# Patient Record
Sex: FEMALE | Race: WHITE
[De-identification: names, ages, dates, MRNs, and addresses within clinical notes are randomized per-mention and may not be internally consistent; named-entity substitution may affect disease eponyms.]

---

## 2017-04-06 ENCOUNTER — HOSPITAL ENCOUNTER (EMERGENCY)
Dept: HOSPITAL 62 - ER | Age: 52
Discharge: HOME | End: 2017-04-06
Payer: MEDICAID

## 2017-04-06 VITALS — SYSTOLIC BLOOD PRESSURE: 129 MMHG | DIASTOLIC BLOOD PRESSURE: 79 MMHG

## 2017-04-06 DIAGNOSIS — R11.10: ICD-10-CM

## 2017-04-06 DIAGNOSIS — Z86.69: ICD-10-CM

## 2017-04-06 DIAGNOSIS — Z87.828: ICD-10-CM

## 2017-04-06 DIAGNOSIS — H54.42: ICD-10-CM

## 2017-04-06 DIAGNOSIS — Z88.0: ICD-10-CM

## 2017-04-06 DIAGNOSIS — R51: Primary | ICD-10-CM

## 2017-04-06 DIAGNOSIS — M25.559: ICD-10-CM

## 2017-04-06 DIAGNOSIS — Z98.890: ICD-10-CM

## 2017-04-06 DIAGNOSIS — Z88.5: ICD-10-CM

## 2017-04-06 DIAGNOSIS — Z79.891: ICD-10-CM

## 2017-04-06 DIAGNOSIS — R03.0: ICD-10-CM

## 2017-04-06 DIAGNOSIS — G89.29: ICD-10-CM

## 2017-04-06 LAB
APPEARANCE UR: (no result)
BILIRUB UR QL STRIP: NEGATIVE
GLUCOSE UR STRIP-MCNC: NEGATIVE MG/DL
KETONES UR STRIP-MCNC: NEGATIVE MG/DL
NITRITE UR QL STRIP: NEGATIVE
PH UR STRIP: 5 [PH] (ref 5–9)
PROT UR STRIP-MCNC: NEGATIVE MG/DL
SP GR UR STRIP: 1.03
UROBILINOGEN UR-MCNC: NEGATIVE MG/DL (ref ?–2)

## 2017-04-06 PROCEDURE — 99284 EMERGENCY DEPT VISIT MOD MDM: CPT

## 2017-04-06 PROCEDURE — 96372 THER/PROPH/DIAG INJ SC/IM: CPT

## 2017-04-06 PROCEDURE — 70450 CT HEAD/BRAIN W/O DYE: CPT

## 2017-04-06 PROCEDURE — 96375 TX/PRO/DX INJ NEW DRUG ADDON: CPT

## 2017-04-06 PROCEDURE — 96374 THER/PROPH/DIAG INJ IV PUSH: CPT

## 2017-04-06 PROCEDURE — 81001 URINALYSIS AUTO W/SCOPE: CPT
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
## 2018-09-27 ENCOUNTER — HOSPITAL ENCOUNTER (EMERGENCY)
Dept: HOSPITAL 62 - ER | Age: 53
Discharge: HOME | End: 2018-09-27
Payer: COMMERCIAL

## 2018-09-27 VITALS — DIASTOLIC BLOOD PRESSURE: 88 MMHG | SYSTOLIC BLOOD PRESSURE: 150 MMHG

## 2018-09-27 DIAGNOSIS — F17.200: ICD-10-CM

## 2018-09-27 DIAGNOSIS — M54.5: Primary | ICD-10-CM
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
## 2018-09-27 DIAGNOSIS — M50.30: ICD-10-CM

## 2018-09-27 DIAGNOSIS — M41.9: ICD-10-CM

## 2018-09-27 DIAGNOSIS — M47.9: ICD-10-CM

## 2018-09-27 DIAGNOSIS — M51.36: ICD-10-CM

## 2018-09-27 PROCEDURE — 72110 X-RAY EXAM L-2 SPINE 4/>VWS: CPT

## 2018-09-27 PROCEDURE — 72050 X-RAY EXAM NECK SPINE 4/5VWS: CPT

## 2018-09-27 PROCEDURE — 99283 EMERGENCY DEPT VISIT LOW MDM: CPT
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
## 2018-12-08 ENCOUNTER — HOSPITAL ENCOUNTER (EMERGENCY)
Dept: HOSPITAL 62 - ER | Age: 53
Discharge: HOME | End: 2018-12-08
Payer: COMMERCIAL

## 2018-12-08 VITALS — SYSTOLIC BLOOD PRESSURE: 136 MMHG | DIASTOLIC BLOOD PRESSURE: 88 MMHG

## 2018-12-08 DIAGNOSIS — Z88.0: ICD-10-CM

## 2018-12-08 DIAGNOSIS — M25.562: ICD-10-CM

## 2018-12-08 DIAGNOSIS — Z88.5: ICD-10-CM
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
## 2018-12-08 DIAGNOSIS — S89.92XA: Primary | ICD-10-CM

## 2018-12-08 DIAGNOSIS — R60.0: ICD-10-CM
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
## 2018-12-08 DIAGNOSIS — Z72.0: ICD-10-CM

## 2018-12-08 PROCEDURE — 99283 EMERGENCY DEPT VISIT LOW MDM: CPT

## 2018-12-08 NOTE — RADIOLOGY REPORT (SQ)
EXAM DESCRIPTION:  KNEE LEFT 4 VIEW



COMPLETED DATE/TIME:  12/8/2018 5:26 pm



REASON FOR STUDY:  Fell and injured left knee in uncertain manner



COMPARISON:  4/18/2011



NUMBER OF VIEWS:  Four views.



TECHNIQUE:  AP, lateral, and both oblique radiographic images acquired of the left knee.



LIMITATIONS:  None.



FINDINGS:  MINERALIZATION: Normal.

BONES: No acute fracture or dislocation.  No worrisome bone lesions.

JOINT: No effusion.

SOFT TISSUES: No soft tissue swelling.  No radio-opaque foreign body.

OTHER: No other significant finding.



IMPRESSION:  NEGATIVE STUDY OF THE LEFT KNEE. NO RADIOGRAPHIC EVIDENCE OF ACUTE INJURY.



TECHNICAL DOCUMENTATION:  JOB ID:  [id number]

 2011 [employer]- All Rights Reserved



Reading location - IP/workstation name: [email or web address]
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
## 2021-01-17 ENCOUNTER — HOSPITAL ENCOUNTER (EMERGENCY)
Dept: HOSPITAL 62 - ER | Age: 56
LOS: 1 days | Discharge: HOME | End: 2021-01-18
Payer: MEDICAID

## 2021-01-17 DIAGNOSIS — F17.200: ICD-10-CM

## 2021-01-17 DIAGNOSIS — Z88.6: ICD-10-CM

## 2021-01-17 DIAGNOSIS — Z88.5: ICD-10-CM

## 2021-01-17 DIAGNOSIS — Z79.1: ICD-10-CM

## 2021-01-17 DIAGNOSIS — Z88.0: ICD-10-CM

## 2021-01-17 DIAGNOSIS — H53.149: ICD-10-CM

## 2021-01-17 DIAGNOSIS — Z79.891: ICD-10-CM

## 2021-01-17 DIAGNOSIS — R11.0: ICD-10-CM

## 2021-01-17 DIAGNOSIS — R03.0: ICD-10-CM

## 2021-01-17 DIAGNOSIS — G43.909: Primary | ICD-10-CM

## 2021-01-17 LAB
ADD MANUAL DIFF: NO
ALBUMIN SERPL-MCNC: 3.6 G/DL (ref 3.5–5)
ALP SERPL-CCNC: 112 U/L (ref 38–126)
ANION GAP SERPL CALC-SCNC: 7 MMOL/L (ref 5–19)
AST SERPL-CCNC: 15 U/L (ref 14–36)
BASOPHILS # BLD AUTO: 0.1 10^3/UL (ref 0–0.2)
BASOPHILS NFR BLD AUTO: 0.6 % (ref 0–2)
BILIRUB DIRECT SERPL-MCNC: 0.3 MG/DL (ref 0–0.4)
BILIRUB SERPL-MCNC: 0.3 MG/DL (ref 0.2–1.3)
BUN SERPL-MCNC: 16 MG/DL (ref 7–20)
CALCIUM: 9.1 MG/DL (ref 8.4–10.2)
CHLORIDE SERPL-SCNC: 104 MMOL/L (ref 98–107)
CO2 SERPL-SCNC: 27 MMOL/L (ref 22–30)
EOSINOPHIL # BLD AUTO: 0.2 10^3/UL (ref 0–0.6)
EOSINOPHIL NFR BLD AUTO: 1.7 % (ref 0–6)
ERYTHROCYTE [DISTWIDTH] IN BLOOD BY AUTOMATED COUNT: 20.4 % (ref 11.5–14)
GLUCOSE SERPL-MCNC: 139 MG/DL (ref 75–110)
HCT VFR BLD CALC: 35.1 % (ref 36–47)
HGB BLD-MCNC: 11.2 G/DL (ref 12–15.5)
LYMPHOCYTES # BLD AUTO: 1.2 10^3/UL (ref 0.5–4.7)
LYMPHOCYTES NFR BLD AUTO: 11.8 % (ref 13–45)
MCH RBC QN AUTO: 22.8 PG (ref 27–33.4)
MCHC RBC AUTO-ENTMCNC: 32 G/DL (ref 32–36)
MCV RBC AUTO: 71 FL (ref 80–97)
MONOCYTES # BLD AUTO: 0.8 10^3/UL (ref 0.1–1.4)
MONOCYTES NFR BLD AUTO: 7.5 % (ref 3–13)
NEUTROPHILS # BLD AUTO: 8.3 10^3/UL (ref 1.7–8.2)
NEUTS SEG NFR BLD AUTO: 78.4 % (ref 42–78)
PLATELET # BLD: 599 10^3/UL (ref 150–450)
POTASSIUM SERPL-SCNC: 4.1 MMOL/L (ref 3.6–5)
PROT SERPL-MCNC: 7.1 G/DL (ref 6.3–8.2)
RBC # BLD AUTO: 4.93 10^6/UL (ref 3.72–5.28)
TOTAL CELLS COUNTED % (AUTO): 100 %
WBC # BLD AUTO: 10.6 10^3/UL (ref 4–10.5)

## 2021-01-17 PROCEDURE — 80053 COMPREHEN METABOLIC PANEL: CPT

## 2021-01-17 PROCEDURE — 96374 THER/PROPH/DIAG INJ IV PUSH: CPT

## 2021-01-17 PROCEDURE — 99285 EMERGENCY DEPT VISIT HI MDM: CPT

## 2021-01-17 PROCEDURE — 96375 TX/PRO/DX INJ NEW DRUG ADDON: CPT

## 2021-01-17 PROCEDURE — 36415 COLL VENOUS BLD VENIPUNCTURE: CPT

## 2021-01-17 PROCEDURE — 70450 CT HEAD/BRAIN W/O DYE: CPT

## 2021-01-17 PROCEDURE — 93010 ELECTROCARDIOGRAM REPORT: CPT

## 2021-01-17 PROCEDURE — 96361 HYDRATE IV INFUSION ADD-ON: CPT

## 2021-01-17 PROCEDURE — 93005 ELECTROCARDIOGRAM TRACING: CPT

## 2021-01-17 PROCEDURE — 85025 COMPLETE CBC W/AUTO DIFF WBC: CPT

## 2021-01-17 NOTE — ER DOCUMENT REPORT
ED Medical Screen (RME)





- General


Chief Complaint: High Blood Pressure


Stated Complaint: BLOOD PRESSURE MOVING UP AND DOWN/HEADACHE


Time Seen by Provider: 01/17/21 22:51


Primary Care Provider: 


SUELLEN GUTIÉRREZ MD [Primary Care Provider] - Follow up as needed


Mode of Arrival: Ambulatory


Information source: Patient


TRAVEL OUTSIDE OF THE [place] IN LAST 30 DAYS: No





- HPI


Patient complains to provider of: Elevated blood pressure, headache


Notes: 





01/17/21 22:57


Patient states that she is here with family member who is admitted to the 

hospital.  Apparently his family member started having seizures and the stressed

her out.  After that time, she developed a headache and has elevated blood 

pressure.  She denies any history of hypertension does not take antihypertensive

medications.  No blood thinners.  She denies any blurred or loss of vision.  She

denies any unilateral numbness, tingling, weakness.  No chest pain or shortness 

of breath.





Exam: Nontoxic, no distress.  Lungs clear and equal throughout.  Heart sounds 

normal.  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  Cranial nerves 

II through XII are grossly intact.  Nonfocal neuro exam.





An initial examination was made on the patient as part of the triage process, 

and it was determined a more comprehensive evaluation was necessary. Initial 

orders were placed and patient was transferred to another provider in the ED who

assumed care and finished evaluation and plan.








- Related Data


Allergies/Adverse Reactions: 


                                        





amoxicillin trihydrate [From Augmentin] Allergy (Verified 12/08/18 15:09)


   


codeine [Codeine] Allergy (Verified 12/08/18 15:09)


   


Penicillins Allergy (Verified 12/08/18 15:09)


   


Potassium Clavulanate * [From Augmentin] Allergy (Verified 12/08/18 15:09)


   











Past Medical History





- Social History


Family history: Reviewed & Not Pertinent


Neurological Medical History: Reports: Hx Migraine


Renal/ Medical History: Denies: Hx Peritoneal Dialysis





- Immunizations


Hx Diphtheria, Pertussis, Tetanus Vaccination: No





Physical Exam





- Vital signs


Vitals: 





                                        











Temp Pulse Resp BP Pulse Ox


 


 98.1 F   104 H  18   145/101 H  98 


 


 01/17/21 22:53  01/17/21 22:53  01/17/21 22:53  01/17/21 22:53  01/17/21 22:53














Course





- Vital Signs


Vital signs: 





                                        











Temp Pulse Resp BP Pulse Ox


 


 98.1 F   104 H  18   145/101 H  98 


 


 01/17/21 22:53  01/17/21 22:53  01/17/21 22:53  01/17/21 22:53  01/17/21 22:53














Doctor's Discharge





- Discharge


Referrals: 


SUELLEN GUTIÉRREZ MD [Primary Care Provider] - Follow up as needed

## 2021-01-17 NOTE — EKG REPORT
SEVERITY:- ABNORMAL ECG -

SINUS RHYTHM

LEFT ATRIAL ABNORMALITY

PROBABLE INFERIOR INFARCT, AGE INDETERMINATE

:

Confirmed by: Katja Garza MD 17-Jan-2021 23:54:55

## 2021-01-18 VITALS — SYSTOLIC BLOOD PRESSURE: 159 MMHG | DIASTOLIC BLOOD PRESSURE: 92 MMHG

## 2021-01-18 NOTE — ER DOCUMENT REPORT
ED General





- General


Chief Complaint: High Blood Pressure


Stated Complaint: BLOOD PRESSURE MOVING UP AND DOWN/HEADACHE


Time Seen by Provider: 21 22:51


Primary Care Provider: 


SUELLEN GUTIÉRREZ MD [Primary Care Provider] - Follow up as needed


Mode of Arrival: Ambulatory


TRAVEL OUTSIDE OF THE [place] IN LAST 30 DAYS: No





- HPI


Notes: 





Patient is a 55-year-old female with a history of chronic migraine headaches who

presents emergency department for evaluation of a headache and an elevated blood

pressure.  She is actually here in the hospital with her son who has chronic 

medical conditions, including a feeding tube, seizures.  He was admitted with a 

bowel obstruction and pneumonia.  He started having seizures, which she normally

does not have at home.  Patient states that after all this she started having 

increased pain in her head, and elevated blood pressure.  She has a history of 

migraines, states this is typical of her normal migraines.  She does have some 

photophobia.  Some mild nausea.  No difficulty seeing, speaking, swallowing.  

She is moving her arms and legs without difficulty.





- Related Data


Allergies/Adverse Reactions: 


                                        





amoxicillin trihydrate [From Augmentin] Allergy (Verified 18 15:09)


   


codeine [Codeine] Allergy (Verified 18 15:09)


   


Penicillins Allergy (Verified 18 15:09)


   


Potassium Clavulanate * [From Augmentin] Allergy (Verified 18 15:09)


   








Home Medications: Motrin, Butrans patch





Past Medical History





- General


Information source: Patient





- Social History


Smoking Status: Current Every Day Smoker


Family History: Reviewed & Not Pertinent


Neurological Medical History: Reports: Hx Migraine


Renal/ Medical History: Denies: Hx Peritoneal Dialysis


Musculoskeletal Medical History: Reports Other - Chronic back pain


Past Surgical History: Reports: Hx  Section





- Immunizations


Hx Diphtheria, Pertussis, Tetanus Vaccination: No





Review of Systems





- Review of Systems


Constitutional: No symptoms reported


EENT: No symptoms reported


Cardiovascular: No symptoms reported


Respiratory: No symptoms reported


Gastrointestinal: No symptoms reported


Genitourinary: No symptoms reported


Musculoskeletal: No symptoms reported


Skin: No symptoms reported


Neurological/Psychological: See HPI





Physical Exam





- Vital signs


Vitals: 


                                        











Temp Pulse Resp BP Pulse Ox


 


 98.1 F   104 H  18   145/101 H  98 


 


 21 22:53  21 22:53  21 22:53  21 22:53  21 22:53














- Notes


Notes: 





This is a 55-year-old female who appears older than her stated age, no acute 

distress.  Vital signs reviewed, please refer to chart. Head is normocephalic, 

atraumatic.  Pupils equal round, reactive to light.  Neck is supple without 

meningismus.  Heart is regular rate and rhythm.  Lungs are clear to auscultation

bilaterally.  Abdomen is soft, nontender, normoactive bowel sounds throughout.  

Extremities without cyanosis, clubbing. Posterior calves are nontender.  

Peripheral pulses are equal.  Skin is warm and dry.  Patient is awake, alert, 

oriented x3.  Cranial nerves II - XII are grossly intact without focal 

neurological deficits.  Strength is plus 5 out of 5 bilateral upper and lower e

xtremities.  Sensation is intact.  Reflexes symmetrical.  Intact 

finger-nose-finger, rapid alternating movements, heel-to-shin.





Course





- Re-evaluation


Re-evalutation: 





21 00:36


Patient presents emergency department for evaluation.  She has pain typical of 

her migraine headaches and elevated blood pressure.  She has a normal n

eurological exam.  She has no signs of endorgan damage, neither on interview, 

nor examination, nor on blood work or imaging.  We will treat her with Reglan, 

Benadryl, oxycodone.  She is given IV fluids.  The patient is anxious to be 

discharged so she can go back up to her son's bedside.  I am amenable to this 

plan.  She is to return to the ED with worsening or new concerning symptoms of 

any sort.





- Vital Signs


Vital signs: 


                                        











Temp Pulse Resp BP Pulse Ox


 


 98.1 F   104 H  13   158/102 H  99 


 


 21 22:53  21 22:53  21 01:35  21 00:30  21 01:35














- Laboratory Results


Result Diagrams: 


                                 21 23:23





                                 21 23:23


Laboratory Results Interpreted: 


                                        











  21





  23:23 23:23


 


WBC  10.6 H 


 


Hgb  11.2 L 


 


Hct  35.1 L 


 


MCV  71 L 


 


MCH  22.8 L 


 


RDW  20.4 H 


 


Plt Count  599 H 


 


Lymph % (Auto)  11.8 L 


 


Absolute Neuts (auto)  8.3 H 


 


Seg Neutrophils %  78.4 H 


 


Est GFR (MDRD) Non-Af   52 L


 


Glucose   139 H











Critical Laboratory Results Reviewed: No Critical Results





- Radiology Results


Radiology Results Interpreted: 





21 00:36





                                        





Head CT  21 22:56


IMPRESSION: Stable examination with no acute intracranial


abnormality. 


 











Critical Radiology Results Reviewed: No Critical Results





- EKG Interpretation by Me


Additional EKG results interpreted by me: 





21 00:38


Sinus mechanism with a rate of 86 bpm.  There is some baseline interference 

noted which interferes with interpretation of leads II and III.  Otherwise 

normal axis and intervals.  Nonspecific ST changes, but no acute changes 

concerning for ischemia or infarction.  No significant change compared to prior 

study.





Discharge





- Discharge


Clinical Impression: 


 Elevated blood pressure reading without diagnosis of hypertension





Migraine


Qualifiers:


 Migraine type: unspecified Status migrainosus presence: without status 

migrainosus 





Condition: Stable


Disposition: HOME, SELF-CARE


Instructions:  High Blood Pressure ([facility]), Migraine Headache ([facility])


Additional Instructions: 


Rest.  Try to avoid stress.  Take your regular medications as prescribed.  

Follow-up with your primary care provider next week.  Return to the emergency 

department with worsening or new concerning symptoms.


Referrals: 


SUELLEN GUTIÉRREZ MD [Primary Care Provider] - Follow up as needed
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
